# Patient Record
Sex: MALE | URBAN - METROPOLITAN AREA
[De-identification: names, ages, dates, MRNs, and addresses within clinical notes are randomized per-mention and may not be internally consistent; named-entity substitution may affect disease eponyms.]

---

## 2023-04-04 ENCOUNTER — OFFICE VISIT (OUTPATIENT)
Facility: LOCATION | Age: 83
End: 2023-04-04
Payer: MEDICARE

## 2023-04-04 DIAGNOSIS — H40.1131 PRIMARY OPEN-ANGLE GLAUCOMA BILATERAL MILD STAGE: Primary | ICD-10-CM

## 2023-04-04 DIAGNOSIS — H04.123 DRY EYE SYNDROME OF BILATERAL LACRIMAL GLANDS: ICD-10-CM

## 2023-04-04 DIAGNOSIS — H16.223 KERATOCONJUNCT SICCA, NOT SPECIFIED AS SJOGREN'S, BILATERAL: ICD-10-CM

## 2023-04-04 PROCEDURE — 92083 EXTENDED VISUAL FIELD XM: CPT | Performed by: OPHTHALMOLOGY

## 2023-04-04 PROCEDURE — 92133 CPTRZD OPH DX IMG PST SGM ON: CPT | Performed by: OPHTHALMOLOGY

## 2023-04-04 PROCEDURE — 99214 OFFICE O/P EST MOD 30 MIN: CPT | Performed by: OPHTHALMOLOGY

## 2023-04-04 ASSESSMENT — INTRAOCULAR PRESSURE
OS: 16
OD: 12

## 2023-04-04 NOTE — IMPRESSION/PLAN
Impression: Examination revealed dry eye syndrome secondary to tear deficiencies. S/P Extended plugs (0.5 mm) x 4 (11/01/2022) Plan: Informed patient if dry spot on the surface of the eyes are improved his vision could improve. Advised patient glaucoma drops can cause increase in dryness. Discussed R/B of punctal plugs vs medicated dry eye drops. Patient expressed understanding and would like to proceed with plugs. Ext plugs 0.5 x4 placed today without complications. Patient was consented and tolerated the procedure well. 
_______________________________________ Continue Refresh Plus 6 x day OU

## 2023-04-04 NOTE — IMPRESSION/PLAN
Impression: Examination revealed primary open angle glaucoma. CC: Patient presents today for 4 month f/u w/ DFE OU, HVF 24-2 and OCT/ONH OU Patient reports compliance with gtts. Plan: TMAX: 17 OU Previous IOP: 12/14 IOP rechecked with Tonopen: 11/13 Today: 
IOP acceptable OU 11/14 OCT/ONH and HVF 24-2 OU performed and reviewed today Informed patient testing and examination today revealed no progression of glaucoma. Will continue to monitor with current medication. Patient expressed understanding. 
_____________________________________ Continue Lumigan OU QHS Continue Dorzolamide OU BID

## 2023-04-04 NOTE — IMPRESSION/PLAN
Impression: Examination reveals keratoconjunctivitis sicca.  Plan: Refer to impression and plan above

## 2023-10-03 ENCOUNTER — OFFICE VISIT (OUTPATIENT)
Facility: LOCATION | Age: 83
End: 2023-10-03
Payer: MEDICARE

## 2023-10-03 DIAGNOSIS — H04.123 DRY EYE SYNDROME OF BILATERAL LACRIMAL GLANDS: Primary | ICD-10-CM

## 2023-10-03 DIAGNOSIS — H40.1131 PRIMARY OPEN-ANGLE GLAUCOMA BILATERAL MILD STAGE: ICD-10-CM

## 2023-10-03 DIAGNOSIS — H16.223 KERATOCONJUNCT SICCA, NOT SPECIFIED AS SJOGREN'S, BILATERAL: ICD-10-CM

## 2023-10-03 PROCEDURE — 99213 OFFICE O/P EST LOW 20 MIN: CPT | Performed by: OPHTHALMOLOGY

## 2023-10-03 ASSESSMENT — INTRAOCULAR PRESSURE
OD: 16
OS: 16
OD: 14
OS: 17

## 2023-10-24 ENCOUNTER — OFFICE VISIT (OUTPATIENT)
Facility: LOCATION | Age: 83
End: 2023-10-24
Payer: MEDICARE

## 2023-10-24 DIAGNOSIS — H40.1131 PRIMARY OPEN-ANGLE GLAUCOMA BILATERAL MILD STAGE: Primary | ICD-10-CM

## 2023-10-24 PROCEDURE — 99213 OFFICE O/P EST LOW 20 MIN: CPT | Performed by: OPHTHALMOLOGY

## 2023-10-24 ASSESSMENT — INTRAOCULAR PRESSURE
OS: 10
OD: 10

## 2024-04-16 ENCOUNTER — OFFICE VISIT (OUTPATIENT)
Facility: LOCATION | Age: 84
End: 2024-04-16
Payer: MEDICARE

## 2024-04-16 DIAGNOSIS — H04.123 DRY EYE SYNDROME OF BILATERAL LACRIMAL GLANDS: ICD-10-CM

## 2024-04-16 DIAGNOSIS — H40.1131 PRIMARY OPEN-ANGLE GLAUCOMA, BILATERAL, MILD STAGE: Primary | ICD-10-CM

## 2024-04-16 PROCEDURE — 92083 EXTENDED VISUAL FIELD XM: CPT | Performed by: OPHTHALMOLOGY

## 2024-04-16 PROCEDURE — 92133 CPTRZD OPH DX IMG PST SGM ON: CPT | Performed by: OPHTHALMOLOGY

## 2024-04-16 PROCEDURE — 99214 OFFICE O/P EST MOD 30 MIN: CPT | Performed by: OPHTHALMOLOGY

## 2024-04-16 RX ORDER — BIMATOPROST 0.1 MG/ML
0.01 % SOLUTION/ DROPS OPHTHALMIC
Qty: 3 | Refills: 5 | Status: ACTIVE
Start: 2024-04-16

## 2024-04-16 RX ORDER — DORZOLAMIDE HYDROCHLORIDE AND TIMOLOL MALEATE 20; 5 MG/ML; MG/ML
SOLUTION/ DROPS OPHTHALMIC
Qty: 10 | Refills: 10 | Status: ACTIVE
Start: 2024-04-16

## 2024-04-16 ASSESSMENT — VISUAL ACUITY
OD: 20/20
OS: 20/150

## 2024-04-16 ASSESSMENT — INTRAOCULAR PRESSURE
OD: 15
OS: 13

## 2024-05-14 ENCOUNTER — OFFICE VISIT (OUTPATIENT)
Facility: LOCATION | Age: 84
End: 2024-05-14
Payer: MEDICARE

## 2024-05-14 DIAGNOSIS — H16.223 KERATOCONJUNCT SICCA, NOT SPECIFIED AS SJOGREN'S, BILATERAL: ICD-10-CM

## 2024-05-14 DIAGNOSIS — H40.1131 PRIMARY OPEN-ANGLE GLAUCOMA BILATERAL MILD STAGE: Primary | ICD-10-CM

## 2024-05-14 DIAGNOSIS — H04.123 DRY EYE SYNDROME OF BILATERAL LACRIMAL GLANDS: ICD-10-CM

## 2024-05-14 PROCEDURE — 99213 OFFICE O/P EST LOW 20 MIN: CPT | Performed by: OPHTHALMOLOGY

## 2024-05-14 ASSESSMENT — INTRAOCULAR PRESSURE
OD: 9
OS: 21
OS: 9
OD: 14

## 2024-08-28 ENCOUNTER — OFFICE VISIT (OUTPATIENT)
Facility: LOCATION | Age: 84
End: 2024-08-28
Payer: MEDICARE

## 2024-08-28 DIAGNOSIS — H04.123 DRY EYE SYNDROME OF BILATERAL LACRIMAL GLANDS: ICD-10-CM

## 2024-08-28 DIAGNOSIS — H40.1131 PRIMARY OPEN-ANGLE GLAUCOMA BILATERAL MILD STAGE: Primary | ICD-10-CM

## 2024-08-28 DIAGNOSIS — H16.223 KERATOCONJUNCT SICCA, NOT SPECIFIED AS SJOGREN'S, BILATERAL: ICD-10-CM

## 2024-08-28 PROCEDURE — 99213 OFFICE O/P EST LOW 20 MIN: CPT | Performed by: OPHTHALMOLOGY

## 2024-08-28 ASSESSMENT — INTRAOCULAR PRESSURE
OD: 12
OS: 14

## 2024-09-17 ENCOUNTER — OFFICE VISIT (OUTPATIENT)
Facility: LOCATION | Age: 84
End: 2024-09-17
Payer: MEDICARE

## 2024-09-17 DIAGNOSIS — H40.1131 PRIMARY OPEN-ANGLE GLAUCOMA BILATERAL MILD STAGE: Primary | ICD-10-CM

## 2024-09-17 PROCEDURE — 99213 OFFICE O/P EST LOW 20 MIN: CPT | Performed by: OPHTHALMOLOGY

## 2024-09-17 ASSESSMENT — INTRAOCULAR PRESSURE
OS: 10
OD: 9

## 2024-11-27 ENCOUNTER — OFFICE VISIT (OUTPATIENT)
Facility: LOCATION | Age: 84
End: 2024-11-27
Payer: MEDICARE

## 2024-11-27 DIAGNOSIS — H04.123 DRY EYE SYNDROME OF BILATERAL LACRIMAL GLANDS: ICD-10-CM

## 2024-11-27 DIAGNOSIS — H16.223 KERATOCONJUNCT SICCA, NOT SPECIFIED AS SJOGREN'S, BILATERAL: ICD-10-CM

## 2024-11-27 DIAGNOSIS — H40.1131 PRIMARY OPEN-ANGLE GLAUCOMA BILATERAL MILD STAGE: Primary | ICD-10-CM

## 2024-11-27 PROCEDURE — 99214 OFFICE O/P EST MOD 30 MIN: CPT | Performed by: STUDENT IN AN ORGANIZED HEALTH CARE EDUCATION/TRAINING PROGRAM

## 2024-11-27 ASSESSMENT — INTRAOCULAR PRESSURE
OD: 12
OD: 11
OS: 19
OS: 17

## 2024-12-27 ENCOUNTER — OFFICE VISIT (OUTPATIENT)
Facility: LOCATION | Age: 84
End: 2024-12-27
Payer: MEDICARE

## 2024-12-27 DIAGNOSIS — H40.1131 PRIMARY OPEN-ANGLE GLAUCOMA BILATERAL MILD STAGE: Primary | ICD-10-CM

## 2024-12-27 PROCEDURE — 76514 ECHO EXAM OF EYE THICKNESS: CPT | Performed by: STUDENT IN AN ORGANIZED HEALTH CARE EDUCATION/TRAINING PROGRAM

## 2024-12-27 PROCEDURE — 92133 CPTRZD OPH DX IMG PST SGM ON: CPT | Performed by: STUDENT IN AN ORGANIZED HEALTH CARE EDUCATION/TRAINING PROGRAM

## 2024-12-27 PROCEDURE — 99213 OFFICE O/P EST LOW 20 MIN: CPT | Performed by: STUDENT IN AN ORGANIZED HEALTH CARE EDUCATION/TRAINING PROGRAM

## 2024-12-27 ASSESSMENT — INTRAOCULAR PRESSURE
OS: 13
OD: 10
OS: 15
OD: 11

## 2025-04-28 ENCOUNTER — OFFICE VISIT (OUTPATIENT)
Facility: LOCATION | Age: 85
End: 2025-04-28
Payer: MEDICARE

## 2025-04-28 DIAGNOSIS — E11.9 TYPE 2 DIABETES MELLITUS WITHOUT COMPLICATIONS: ICD-10-CM

## 2025-04-28 DIAGNOSIS — H04.123 DRY EYE SYNDROME OF BILATERAL LACRIMAL GLANDS: ICD-10-CM

## 2025-04-28 DIAGNOSIS — H40.1131 PRIMARY OPEN-ANGLE GLAUCOMA, BILATERAL, MILD STAGE: Primary | ICD-10-CM

## 2025-04-28 PROCEDURE — 99213 OFFICE O/P EST LOW 20 MIN: CPT | Performed by: STUDENT IN AN ORGANIZED HEALTH CARE EDUCATION/TRAINING PROGRAM

## 2025-04-28 PROCEDURE — 92083 EXTENDED VISUAL FIELD XM: CPT | Performed by: STUDENT IN AN ORGANIZED HEALTH CARE EDUCATION/TRAINING PROGRAM

## 2025-04-28 ASSESSMENT — INTRAOCULAR PRESSURE
OS: 12
OD: 13
OS: 13
OD: 12

## 2025-08-25 ENCOUNTER — OFFICE VISIT (OUTPATIENT)
Facility: LOCATION | Age: 85
End: 2025-08-25
Payer: MEDICARE

## 2025-08-25 DIAGNOSIS — E11.9 TYPE 2 DIABETES MELLITUS WITHOUT COMPLICATIONS: ICD-10-CM

## 2025-08-25 DIAGNOSIS — H16.223 KERATOCONJUNCT SICCA, NOT SPECIFIED AS SJOGREN'S, BILATERAL: ICD-10-CM

## 2025-08-25 DIAGNOSIS — H40.1131 PRIMARY OPEN-ANGLE GLAUCOMA BILATERAL MILD STAGE: Primary | ICD-10-CM

## 2025-08-25 DIAGNOSIS — H04.123 DRY EYE SYNDROME OF BILATERAL LACRIMAL GLANDS: ICD-10-CM

## 2025-08-25 PROCEDURE — 99213 OFFICE O/P EST LOW 20 MIN: CPT | Performed by: STUDENT IN AN ORGANIZED HEALTH CARE EDUCATION/TRAINING PROGRAM

## 2025-08-25 ASSESSMENT — INTRAOCULAR PRESSURE
OS: 11
OD: 10